# Patient Record
Sex: FEMALE | URBAN - METROPOLITAN AREA
[De-identification: names, ages, dates, MRNs, and addresses within clinical notes are randomized per-mention and may not be internally consistent; named-entity substitution may affect disease eponyms.]

---

## 2024-05-13 ENCOUNTER — TELEPHONE (OUTPATIENT)
Dept: PHARMACY | Facility: CLINIC | Age: 19
End: 2024-05-13

## 2024-05-13 NOTE — TELEPHONE ENCOUNTER
Completed chart review and assessed patient medication adherence for [facility].

## 2025-02-17 ENCOUNTER — OFFICE VISIT (OUTPATIENT)
Dept: URGENT CARE | Facility: CLINIC | Age: 20
End: 2025-02-17
Payer: COMMERCIAL

## 2025-02-17 VITALS
DIASTOLIC BLOOD PRESSURE: 56 MMHG | SYSTOLIC BLOOD PRESSURE: 111 MMHG | TEMPERATURE: 97 F | BODY MASS INDEX: 27.07 KG/M2 | RESPIRATION RATE: 18 BRPM | HEART RATE: 65 BPM | WEIGHT: 182.75 LBS | OXYGEN SATURATION: 99 % | HEIGHT: 69 IN

## 2025-02-17 DIAGNOSIS — J06.9 URI WITH COUGH AND CONGESTION: Primary | ICD-10-CM

## 2025-02-17 DIAGNOSIS — J04.0 LARYNGITIS: ICD-10-CM

## 2025-02-17 LAB
CTP QC/QA: YES
CTP QC/QA: YES
POC MOLECULAR INFLUENZA A AGN: NEGATIVE
POC MOLECULAR INFLUENZA B AGN: NEGATIVE
SARS CORONAVIRUS 2 ANTIGEN: NEGATIVE

## 2025-02-17 RX ORDER — BENZONATATE 200 MG/1
200 CAPSULE ORAL 3 TIMES DAILY PRN
Qty: 21 CAPSULE | Refills: 0 | Status: SHIPPED | OUTPATIENT
Start: 2025-02-17 | End: 2025-02-24

## 2025-02-17 RX ORDER — SERDEXMETHYLPHENIDATE AND DEXMETHYLPHENIDATE 7.8; 39.2 MG/1; MG/1
1 CAPSULE ORAL EVERY MORNING
COMMUNITY
Start: 2024-11-05

## 2025-02-17 RX ORDER — PREDNISONE 20 MG/1
TABLET ORAL
Qty: 7 TABLET | Refills: 0 | Status: SHIPPED | OUTPATIENT
Start: 2025-02-17 | End: 2025-02-22

## 2025-02-17 NOTE — PATIENT INSTRUCTIONS
ZYRTEC D for congestion and runny nose. Tessalon for cough up to 3x daily.  Sterile saline rinses and flonase to decongest. Tylenol or motrin for pain/fever. Rest and drink plenty of fluids.     May try prednisone to help with congestion. Discontinue if you cannot tolerate the side effects - may cause anxiety, insomnnia, palpitations and fluid retention.      Follow up with your doctor for any persistent new fever, or persistent sinus pressure/congestion > 10 days.You need to be seen urgently if you develop any chest pain or shortness of breath.

## 2025-02-17 NOTE — LETTER
February 18, 2025      [facility] [address] [place] [address]  Phone: [phone]  Fax: [phone]       Patient: Isadora Coulter   YOB: 2005  Date of Visit: 02/17/2025    To Whom It May Concern:    Loan Coulter  was at [facility] on 02/17/2025. The patient may return to work/school on 02/19/2025 with no restrictions. If you have any questions or concerns, or if I can be of further assistance, please do not hesitate to contact me.    Sincerely,    Tereza Valdivia RT

## 2025-02-17 NOTE — PROGRESS NOTES
"Subjective:      Patient ID: Isadora Coulter is a 20 y.o. female.    Vitals:  height is 5' 9" (1.753 m) and weight is 82.9 kg (182 lb 12.2 oz). Her tympanic temperature is 96.7 °F (35.9 °C). Her blood pressure is 111/56 (abnormal) and her pulse is 65. Her respiration is 18 and oxygen saturation is 99%.     Chief Complaint: Sore Throat    Patient presents with CCC, hoarse voice, headaches/ sore throat .  Symptoms started 4 days ago     Sore Throat   This is a new problem. The current episode started in the past 7 days. The problem has been gradually worsening. There has been no fever. The fever has been present for Less than 1 day. The pain is at a severity of 8/10. The pain is mild. Associated symptoms include congestion, coughing, ear pain, headaches and a hoarse voice. Pertinent negatives include no abdominal pain, diarrhea, drooling, ear discharge, plugged ear sensation, neck pain, shortness of breath, stridor, swollen glands, trouble swallowing or vomiting. She has had no exposure to strep or mono. She has tried acetaminophen (otc sinus meds) for the symptoms. The treatment provided mild relief.       Constitution: Positive for chills, sweating and fatigue. Negative for fever.   HENT:  Positive for ear pain, congestion, postnasal drip, sinus pressure and sore throat. Negative for ear discharge, drooling and trouble swallowing.    Neck: Negative for neck pain.   Respiratory:  Positive for cough and sputum production. Negative for shortness of breath and stridor.    Gastrointestinal:  Negative for abdominal pain, vomiting and diarrhea.   Neurological:  Positive for headaches.      Objective:     Physical Exam   Constitutional: She is oriented to person, place, and time. She appears well-developed. She is cooperative.  Non-toxic appearance. She does not appear ill. No distress.   HENT:   Head: Normocephalic and atraumatic.   Ears:   Right Ear: Hearing, tympanic membrane, external ear and ear canal normal.   Left Ear: " Hearing, tympanic membrane, external ear and ear canal normal.   Nose: Mucosal edema, rhinorrhea and congestion present. No nasal deformity. No epistaxis. Right sinus exhibits no maxillary sinus tenderness and no frontal sinus tenderness. Left sinus exhibits no maxillary sinus tenderness and no frontal sinus tenderness.   Mouth/Throat: Uvula is midline, oropharynx is clear and moist and mucous membranes are normal. No trismus in the jaw. Normal dentition. No uvula swelling. No oropharyngeal exudate, posterior oropharyngeal edema or posterior oropharyngeal erythema.   Eyes: Conjunctivae and lids are normal. No scleral icterus.   Neck: Trachea normal and phonation normal. Neck supple. No edema present. No erythema present. No neck rigidity present.   Cardiovascular: Normal rate, regular rhythm, normal heart sounds and normal pulses.   Pulmonary/Chest: Effort normal and breath sounds normal. No respiratory distress. She has no decreased breath sounds. She has no rhonchi.   Abdominal: Normal appearance.   Musculoskeletal: Normal range of motion.         General: No deformity. Normal range of motion.   Lymphadenopathy:     She has no cervical adenopathy.   Neurological: She is alert and oriented to person, place, and time. She exhibits normal muscle tone. Coordination normal.   Skin: Skin is warm, dry, intact, not diaphoretic and not pale.   Psychiatric: Her speech is normal and behavior is normal. Judgment and thought content normal.   Nursing note and vitals reviewed.    Results for orders placed or performed in visit on 02/17/25   SARS Coronavirus 2 Antigen, POCT Manual Read    Collection Time: 02/17/25 12:45 PM   Result Value Ref Range    SARS Coronavirus 2 Antigen Negative Negative, Presumptive Negative     Acceptable Yes    POCT Influenza A/B Molecular    Collection Time: 02/17/25 12:45 PM   Result Value Ref Range    POC Molecular Influenza A Ag Negative Negative    POC Molecular Influenza B Ag  Negative Negative     Acceptable Yes        Assessment:     1. URI with cough and congestion    2. Laryngitis        Plan:       URI with cough and congestion  -     SARS Coronavirus 2 Antigen, POCT Manual Read  -     POCT Influenza A/B Molecular  -     benzonatate (TESSALON) 200 MG capsule; Take 1 capsule (200 mg total) by mouth 3 (three) times daily as needed for Cough.  Dispense: 21 capsule; Refill: 0  -     predniSONE (DELTASONE) 20 MG tablet; Take 1 tablet (20 mg total) by mouth 2 (two) times daily for 2 days, THEN 1 tablet (20 mg total) once daily for 3 days.  Dispense: 7 tablet; Refill: 0    Laryngitis    Josie Lao PA-C  [facility]       Patient Instructions   ZYRTEC D for congestion and runny nose. Tessalon for cough up to 3x daily.  Sterile saline rinses and flonase to decongest. Tylenol or motrin for pain/fever. Rest and drink plenty of fluids.     May try prednisone to help with congestion. Discontinue if you cannot tolerate the side effects - may cause anxiety, insomnnia, palpitations and fluid retention.      Follow up with your doctor for any persistent new fever, or persistent sinus pressure/congestion > 10 days.You need to be seen urgently if you develop any chest pain or shortness of breath.           Medical Decision Making:   Clinical Tests:   Lab Tests: Ordered and Reviewed  Consistent with viral etiology. Pt requested IM steroid. Risks and side effects discussed, as well as option to take po steroids with similar efficacy. Pt elects to try oral steroid instead and will d/c if ADEs. Recommend zyrtec d, flonase, tessalon

## 2025-02-18 ENCOUNTER — PATIENT MESSAGE (OUTPATIENT)
Dept: URGENT CARE | Facility: CLINIC | Age: 20
End: 2025-02-18
Payer: COMMERCIAL